# Patient Record
Sex: MALE | Race: WHITE
[De-identification: names, ages, dates, MRNs, and addresses within clinical notes are randomized per-mention and may not be internally consistent; named-entity substitution may affect disease eponyms.]

---

## 2018-10-02 ENCOUNTER — HOSPITAL ENCOUNTER (EMERGENCY)
Dept: HOSPITAL 74 - FER | Age: 23
Discharge: HOME | End: 2018-10-02
Payer: COMMERCIAL

## 2018-10-02 VITALS — BODY MASS INDEX: 25 KG/M2

## 2018-10-02 VITALS — SYSTOLIC BLOOD PRESSURE: 137 MMHG | HEART RATE: 96 BPM | TEMPERATURE: 98.7 F | DIASTOLIC BLOOD PRESSURE: 90 MMHG

## 2018-10-02 DIAGNOSIS — Q23.4: ICD-10-CM

## 2018-10-02 DIAGNOSIS — J01.11: Primary | ICD-10-CM

## 2018-10-02 LAB
ALBUMIN SERPL-MCNC: 4.5 G/DL (ref 3.5–5)
ALP SERPL-CCNC: 66 U/L (ref 32–92)
ALT SERPL-CCNC: 20 U/L (ref 10–40)
ANION GAP SERPL CALC-SCNC: 5 MMOL/L (ref 8–16)
AST SERPL-CCNC: 17 U/L (ref 10–42)
BASOPHILS # BLD: 1 % (ref 0–2)
BILIRUB SERPL-MCNC: 1.1 MG/DL (ref 0.2–1)
BUN SERPL-MCNC: 10 MG/DL (ref 7–18)
CALCIUM SERPL-MCNC: 9.4 MG/DL (ref 8.4–10.2)
CHLORIDE SERPL-SCNC: 109 MMOL/L (ref 98–107)
CO2 SERPL-SCNC: 24 MMOL/L (ref 22–28)
CREAT SERPL-MCNC: 0.8 MG/DL (ref 0.6–1.3)
DEPRECATED RDW RBC AUTO: 12.5 % (ref 11.9–15.9)
EOSINOPHIL # BLD: 1.6 % (ref 0–4.5)
GLUCOSE SERPL-MCNC: 105 MG/DL (ref 74–106)
HCT VFR BLD CALC: 46.8 % (ref 35.4–49)
HGB BLD-MCNC: 15.7 GM/DL (ref 11.7–16.9)
LYMPHOCYTES # BLD: 8.2 % (ref 8–40)
MCH RBC QN AUTO: 30.9 PG (ref 25.7–33.7)
MCHC RBC AUTO-ENTMCNC: 33.5 G/DL (ref 32–35.9)
MCV RBC: 92.4 FL (ref 80–96)
MONOCYTES # BLD AUTO: 4.6 % (ref 3.8–10.2)
NEUTROPHILS # BLD: 84.6 % (ref 42.8–82.8)
PLATELET # BLD AUTO: 182 K/MM3 (ref 134–434)
PMV BLD: 10.1 FL (ref 7.5–11.1)
POTASSIUM SERPLBLD-SCNC: 3.9 MMOL/L (ref 3.5–5.1)
PROT SERPL-MCNC: 7.6 G/DL (ref 6.4–8.3)
RBC # BLD AUTO: 5.06 M/MM3 (ref 4–5.6)
SODIUM SERPL-SCNC: 138 MMOL/L (ref 136–145)
WBC # BLD AUTO: 7.7 K/MM3 (ref 4–10.8)

## 2018-10-02 NOTE — PDOC
History of Present Illness





- History of Present Illness


Initial Comments: 





10/02/18 15:12


The patient is a 23 year old male with a significant past medical history of 

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 

was 5 and a cardiac cath 5 years ago who presents to the ER with cough and 

congestion that worsened last night. Patient states he had similar symptoms 3 

weeks ago, was prescribed augmentin which he took for one week and then stopped 

taking it because he started feeling better. Patient noticed the symptoms again 

this past week which worsened last night. Patient was seen at an urgent care 

today and was prompted to go to the ER for further evaluation 





The patient denies chest pain, shortness of breath, headache, and dizziness. 


Denies fever, chills, nausea, vomit, diarrhea, and constipation. 


Denies dysuria, frequency, urgency, and hematuria. 





Allergies: NKA 


Past surgical history: open heart surgeries, cardiac cath


Social history: Drinks socially. No reported drug or cigarette use. 








<Syl Guevara - Last Filed: 10/02/18 15:12>





- General


History Source: Patient


Exam Limitations: No Limitations





<Kandice Mcgrath - Last Filed: 10/02/18 16:15>





- General


Chief Complaint: Respiratory


Stated Complaint: COUGH, NASAL CONGESTION


Time Seen by Provider: 10/02/18 15:02





Past History





<Syl Guevara - Last Filed: 10/02/18 15:12>





<Kandice Mcgrath - Last Filed: 10/02/18 16:15>





- Past Medical History


Allergies/Adverse Reactions: 


 Allergies











Allergy/AdvReac Type Severity Reaction Status Date / Time


 


No Known Allergies Allergy   Verified 10/02/18 14:59











Home Medications: 


Ambulatory Orders





Amoxicillin/Potassium Clav [Augmentin 875-125 Tablet] 1 each PO BID #20 tablet 

10/02/18 


Aspirin [Aspirin EC] 81 mg PO DAILY 10/02/18 


Guaifenesin [Mucinex -] 600 mg PO BID #14 tablet.er 10/02/18 


Pseudoephedrine HCl [Sudafed] 60 mg PO Q6H PRN #30 tablet 10/02/18 











**Review of Systems





- Review of Systems


Able to Perform ROS?: Yes


Comments:: 





10/02/18 15:13





ADULT ROS


GENERAL/CONSTITUTIONAL: No fever or chills. No weakness.


HEAD, EYES, EARS, NOSE AND THROAT: No change in vision. No ear pain or 

discharge. No sore throat.


CARDIOVASCULAR: No chest pain or shortness of breath.


RESPIRATORY: No cough, wheezing, or hemoptysis. (+) Congestion. (+) Cough.  


GASTROINTESTINAL: No nausea, vomiting, diarrhea or constipation.


GENITOURINARY: No dysuria, frequency, or change in urination.


MUSCULOSKELETAL: No joint or muscle swelling or pain. No neck or back pain.


SKIN: No rash


NEUROLOGIC: No headache, vertigo, loss of consciousness, or change in strength/

sensation.


ENDOCRINE: No increased thirst. No abnormal weight change.


HEMATOLOGIC/LYMPHATIC: No anemia, easy bleeding, or history of blood clots.


ALLERGIC/IMMUNOLOGIC: No hives or skin allergy.








<Syl Guevara - Last Filed: 10/02/18 15:12>





*Physical Exam





- Vital Signs


 Last Vital Signs











Temp Pulse Resp BP Pulse Ox


 


 98.7 F   96 H  18   137/90   90 L


 


 10/02/18 14:55  10/02/18 14:55  10/02/18 14:55  10/02/18 14:55  10/02/18 14:55














- Physical Exam


Comments: 





10/02/18 15:07


ADULT PE


GENERAL: The patient is in no acute distress.


HEAD: Normal with no signs of trauma.


EYES: PERRLA, EOMI, sclera anicteric, conjunctiva clear.


ENT: Ears normal, nares patent, oropharynx clear without exudates. TMs are 

normal. 


Moist mucous membranes.


NECK: Normal range of motion, supple without lymphadenopathy, JVD, or masses.


LUNGS: Breath sounds equal, clear to auscultation bilaterally. No


wheezes, and no crackles.


HEART:Regular rate and rhythm, normal S1 and S2 without murmur, rub or gallop.


ABDOMEN: Soft, nontender, normoactive bowel sounds. No guarding, no


rebound. No masses palpable.


EXTREMITIES: Normal range of motion, no edema. No clubbing or


cyanosis. No erythema, or tenderness.


NEUROLOGICAL: Cranial nerves II through XII grossly intact. Normal


speech. No focal neurological deficits.


MUSCULOSKELETAL: Back non-tender to palpation, no CVA tenderness


SKIN: Warm, Dry, normal turgor, no rashes or lesions noted.














<Syl Guevara - Last Filed: 10/02/18 15:12>





ED Treatment Course





- LABORATORY


CBC & Chemistry Diagram: 


 10/02/18 15:15





 10/02/18 15:15





<Kandice Mcgrath - Last Filed: 10/02/18 16:15>





Medical Decision Making





- Medical Decision Making





10/02/18 16:10


 Laboratory Tests











  10/02/18 10/02/18 10/02/18





  15:15 15:15 15:15


 


WBC  7.7  


 


Hgb  15.7  


 


Hct  46.8  


 


Plt Count  182  


 


Creatine Kinase   50 


 


Troponin I    < 0.03








CXR: nml, no infiltrates, no consolidation


Will discharge to home on Augmentin


Follow up with PMD








Clinical impression: sinusitis, initial presentation





<Kandice Mcgrath - Last Filed: 10/02/18 16:15>





*DC/Admit/Observation/Transfer





- Attestations


Scribe Attestion: 





10/02/18 15:24





Documentation prepared by Syl Guevara, acting as medical scribe for Kandice Mcgrath MD.





<Syl Guevara - Last Filed: 10/02/18 15:12>





- Discharge Dispostion


Decision to Admit order: No





<Kandice Mcgrath - Last Filed: 10/02/18 16:15>


Diagnosis at time of Disposition: 


Sinusitis


Qualifiers:


 Sinusitis location: frontal Chronicity: acute Recurrence: recurrent Qualified 

Code(s): J01.11 - Acute recurrent frontal sinusitis








- Discharge Dispostion


Disposition: HOME


Condition at time of disposition: Stable





- Prescriptions


Prescriptions: 


Amoxicillin/Potassium Clav [Augmentin 875-125 Tablet] 1 each PO BID #20 tablet





- Patient Instructions


Printed Discharge Instructions:  DI for Sinusitis, Sinusitis (Alternative 

Therapy)


Additional Instructions: 


James





Thank you for coming into the emergency department today.


Please take medications as prescribed.


Please monitor herself for fevers by taking her temperature.


Please review medications with your cardiologist or cardiac surgeon.


Please return to emergency department immediately for any other concerns or 

complaints.








- Post Discharge Activity


Forms/Work/School Notes:  Back to School